# Patient Record
Sex: MALE | Race: WHITE | NOT HISPANIC OR LATINO
[De-identification: names, ages, dates, MRNs, and addresses within clinical notes are randomized per-mention and may not be internally consistent; named-entity substitution may affect disease eponyms.]

---

## 2018-01-22 ENCOUNTER — HISTORIC RESULTS (OUTPATIENT)
Dept: ADMINISTRATIVE | Age: 78
End: 2018-01-22

## 2020-09-28 ENCOUNTER — AMBULATORY - HEALTHEAST (OUTPATIENT)
Dept: CARDIAC REHAB | Facility: CLINIC | Age: 80
End: 2020-09-28

## 2020-09-28 DIAGNOSIS — I21.4 NON-ST ELEVATION MI (NSTEMI) (H): ICD-10-CM

## 2020-09-28 DIAGNOSIS — Z95.5 STENTED CORONARY ARTERY: ICD-10-CM

## 2020-10-01 ENCOUNTER — AMBULATORY - HEALTHEAST (OUTPATIENT)
Dept: CARDIAC REHAB | Facility: CLINIC | Age: 80
End: 2020-10-01

## 2020-10-01 DIAGNOSIS — Z95.5 STENTED CORONARY ARTERY: ICD-10-CM

## 2020-10-01 DIAGNOSIS — I21.4 NON-ST ELEVATION MI (NSTEMI) (H): ICD-10-CM

## 2020-10-14 ENCOUNTER — AMBULATORY - HEALTHEAST (OUTPATIENT)
Dept: CARDIAC REHAB | Facility: CLINIC | Age: 80
End: 2020-10-14

## 2020-10-14 DIAGNOSIS — Z95.5 STENTED CORONARY ARTERY: ICD-10-CM

## 2020-10-14 DIAGNOSIS — I21.4 NON-ST ELEVATION MI (NSTEMI) (H): ICD-10-CM

## 2020-10-21 ENCOUNTER — AMBULATORY - HEALTHEAST (OUTPATIENT)
Dept: CARDIAC REHAB | Facility: CLINIC | Age: 80
End: 2020-10-21

## 2020-10-21 DIAGNOSIS — Z95.5 STENTED CORONARY ARTERY: ICD-10-CM

## 2020-10-21 DIAGNOSIS — I21.4 NON-ST ELEVATION MI (NSTEMI) (H): ICD-10-CM

## 2020-10-21 RX ORDER — CLOPIDOGREL BISULFATE 75 MG/1
75 TABLET ORAL DAILY
Status: SHIPPED | COMMUNITY
Start: 2020-10-21

## 2020-10-21 RX ORDER — METOPROLOL SUCCINATE 25 MG/1
25 TABLET, EXTENDED RELEASE ORAL DAILY
Status: SHIPPED | COMMUNITY
Start: 2020-10-21

## 2020-10-21 RX ORDER — NITROGLYCERIN 0.4 MG/1
0.4 TABLET SUBLINGUAL EVERY 5 MIN PRN
Status: SHIPPED | COMMUNITY
Start: 2020-10-21

## 2020-10-21 RX ORDER — CALCIUM CARBONATE 500 MG/1
1 TABLET, CHEWABLE ORAL DAILY
Status: SHIPPED | COMMUNITY
Start: 2020-10-21

## 2020-10-21 RX ORDER — LISINOPRIL 5 MG/1
5 TABLET ORAL DAILY
Status: SHIPPED | COMMUNITY
Start: 2020-10-21

## 2020-10-21 RX ORDER — ASPIRIN 81 MG/1
81 TABLET, CHEWABLE ORAL DAILY
Status: SHIPPED | COMMUNITY
Start: 2020-10-21

## 2020-10-21 RX ORDER — DIPHENHYDRAMINE HCL 25 MG
25 CAPSULE ORAL EVERY 6 HOURS PRN
Status: SHIPPED | COMMUNITY
Start: 2020-10-21

## 2020-10-21 RX ORDER — POLYETHYLENE GLYCOL 3350 17 G/17G
17 POWDER, FOR SOLUTION ORAL DAILY
Status: SHIPPED | COMMUNITY
Start: 2020-10-21

## 2020-10-28 ENCOUNTER — AMBULATORY - HEALTHEAST (OUTPATIENT)
Dept: CARDIAC REHAB | Facility: CLINIC | Age: 80
End: 2020-10-28

## 2020-10-28 DIAGNOSIS — Z95.5 STENTED CORONARY ARTERY: ICD-10-CM

## 2020-10-28 DIAGNOSIS — I21.4 NON-ST ELEVATION MI (NSTEMI) (H): ICD-10-CM

## 2020-11-04 ENCOUNTER — AMBULATORY - HEALTHEAST (OUTPATIENT)
Dept: CARDIAC REHAB | Facility: CLINIC | Age: 80
End: 2020-11-04

## 2020-11-04 DIAGNOSIS — Z95.5 STENTED CORONARY ARTERY: ICD-10-CM

## 2020-11-04 DIAGNOSIS — I21.4 NON-ST ELEVATION MI (NSTEMI) (H): ICD-10-CM

## 2020-11-11 ENCOUNTER — AMBULATORY - HEALTHEAST (OUTPATIENT)
Dept: CARDIAC REHAB | Facility: CLINIC | Age: 80
End: 2020-11-11

## 2020-11-11 DIAGNOSIS — I21.4 NON-ST ELEVATION MI (NSTEMI) (H): ICD-10-CM

## 2020-11-11 DIAGNOSIS — Z95.5 STENTED CORONARY ARTERY: ICD-10-CM

## 2020-11-13 ENCOUNTER — AMBULATORY - HEALTHEAST (OUTPATIENT)
Dept: CARDIAC REHAB | Facility: CLINIC | Age: 80
End: 2020-11-13

## 2020-11-13 DIAGNOSIS — I21.4 NON-ST ELEVATION MI (NSTEMI) (H): ICD-10-CM

## 2020-11-13 DIAGNOSIS — Z95.5 STENTED CORONARY ARTERY: ICD-10-CM

## 2020-11-18 ENCOUNTER — AMBULATORY - HEALTHEAST (OUTPATIENT)
Dept: CARDIAC REHAB | Facility: CLINIC | Age: 80
End: 2020-11-18

## 2020-11-18 DIAGNOSIS — Z95.5 STENTED CORONARY ARTERY: ICD-10-CM

## 2020-11-18 DIAGNOSIS — I21.4 NON-ST ELEVATION MI (NSTEMI) (H): ICD-10-CM

## 2020-11-20 ENCOUNTER — AMBULATORY - HEALTHEAST (OUTPATIENT)
Dept: CARDIAC REHAB | Facility: CLINIC | Age: 80
End: 2020-11-20

## 2020-11-20 DIAGNOSIS — I21.4 NON-ST ELEVATION MI (NSTEMI) (H): ICD-10-CM

## 2020-11-20 DIAGNOSIS — Z95.5 STENTED CORONARY ARTERY: ICD-10-CM

## 2020-11-25 ENCOUNTER — AMBULATORY - HEALTHEAST (OUTPATIENT)
Dept: CARDIAC REHAB | Facility: CLINIC | Age: 80
End: 2020-11-25

## 2020-11-25 DIAGNOSIS — Z95.5 STENTED CORONARY ARTERY: ICD-10-CM

## 2020-11-25 DIAGNOSIS — I21.4 NON-ST ELEVATION MI (NSTEMI) (H): ICD-10-CM

## 2020-11-27 ENCOUNTER — AMBULATORY - HEALTHEAST (OUTPATIENT)
Dept: CARDIAC REHAB | Facility: CLINIC | Age: 80
End: 2020-11-27

## 2020-11-27 DIAGNOSIS — I21.4 NON-ST ELEVATION MI (NSTEMI) (H): ICD-10-CM

## 2020-11-27 DIAGNOSIS — Z95.5 STENTED CORONARY ARTERY: ICD-10-CM

## 2020-12-02 ENCOUNTER — AMBULATORY - HEALTHEAST (OUTPATIENT)
Dept: CARDIAC REHAB | Facility: CLINIC | Age: 80
End: 2020-12-02

## 2020-12-02 DIAGNOSIS — Z95.5 STENTED CORONARY ARTERY: ICD-10-CM

## 2020-12-02 DIAGNOSIS — I21.4 NON-ST ELEVATION MI (NSTEMI) (H): ICD-10-CM

## 2020-12-04 ENCOUNTER — AMBULATORY - HEALTHEAST (OUTPATIENT)
Dept: CARDIAC REHAB | Facility: CLINIC | Age: 80
End: 2020-12-04

## 2020-12-04 DIAGNOSIS — I21.4 NON-ST ELEVATION MI (NSTEMI) (H): ICD-10-CM

## 2020-12-04 DIAGNOSIS — Z95.5 STENTED CORONARY ARTERY: ICD-10-CM

## 2020-12-09 ENCOUNTER — AMBULATORY - HEALTHEAST (OUTPATIENT)
Dept: CARDIAC REHAB | Facility: CLINIC | Age: 80
End: 2020-12-09

## 2020-12-09 DIAGNOSIS — I21.4 NON-ST ELEVATION MI (NSTEMI) (H): ICD-10-CM

## 2020-12-09 DIAGNOSIS — Z95.5 STENTED CORONARY ARTERY: ICD-10-CM

## 2020-12-11 ENCOUNTER — AMBULATORY - HEALTHEAST (OUTPATIENT)
Dept: CARDIAC REHAB | Facility: CLINIC | Age: 80
End: 2020-12-11

## 2020-12-11 DIAGNOSIS — I21.4 NON-ST ELEVATION MI (NSTEMI) (H): ICD-10-CM

## 2020-12-11 DIAGNOSIS — Z95.5 STENTED CORONARY ARTERY: ICD-10-CM

## 2020-12-16 ENCOUNTER — AMBULATORY - HEALTHEAST (OUTPATIENT)
Dept: CARDIAC REHAB | Facility: CLINIC | Age: 80
End: 2020-12-16

## 2020-12-16 DIAGNOSIS — I21.4 NON-ST ELEVATION MI (NSTEMI) (H): ICD-10-CM

## 2020-12-16 DIAGNOSIS — Z95.5 STENTED CORONARY ARTERY: ICD-10-CM

## 2020-12-18 ENCOUNTER — AMBULATORY - HEALTHEAST (OUTPATIENT)
Dept: CARDIAC REHAB | Facility: CLINIC | Age: 80
End: 2020-12-18

## 2020-12-18 DIAGNOSIS — I21.4 NON-ST ELEVATION MI (NSTEMI) (H): ICD-10-CM

## 2020-12-18 DIAGNOSIS — Z95.5 STENTED CORONARY ARTERY: ICD-10-CM

## 2020-12-30 ENCOUNTER — AMBULATORY - HEALTHEAST (OUTPATIENT)
Dept: CARDIAC REHAB | Facility: CLINIC | Age: 80
End: 2020-12-30

## 2020-12-30 DIAGNOSIS — I21.4 NON-ST ELEVATION MI (NSTEMI) (H): ICD-10-CM

## 2021-01-06 ENCOUNTER — AMBULATORY - HEALTHEAST (OUTPATIENT)
Dept: CARDIAC REHAB | Facility: CLINIC | Age: 81
End: 2021-01-06

## 2021-01-06 DIAGNOSIS — I21.4 NON-ST ELEVATION MI (NSTEMI) (H): ICD-10-CM

## 2021-01-06 DIAGNOSIS — Z95.5 STENTED CORONARY ARTERY: ICD-10-CM

## 2021-01-08 ENCOUNTER — AMBULATORY - HEALTHEAST (OUTPATIENT)
Dept: CARDIAC REHAB | Facility: CLINIC | Age: 81
End: 2021-01-08

## 2021-01-08 DIAGNOSIS — Z95.5 STENTED CORONARY ARTERY: ICD-10-CM

## 2021-01-08 DIAGNOSIS — I21.4 NON-ST ELEVATION MI (NSTEMI) (H): ICD-10-CM

## 2021-02-03 ENCOUNTER — AMBULATORY - HEALTHEAST (OUTPATIENT)
Dept: CARDIAC REHAB | Facility: CLINIC | Age: 81
End: 2021-02-03

## 2021-02-03 DIAGNOSIS — I21.4 NON-ST ELEVATION MI (NSTEMI) (H): ICD-10-CM

## 2021-02-03 DIAGNOSIS — Z95.5 STENTED CORONARY ARTERY: ICD-10-CM

## 2021-02-05 ENCOUNTER — AMBULATORY - HEALTHEAST (OUTPATIENT)
Dept: CARDIAC REHAB | Facility: CLINIC | Age: 81
End: 2021-02-05

## 2021-02-05 DIAGNOSIS — Z95.5 STENTED CORONARY ARTERY: ICD-10-CM

## 2021-02-05 DIAGNOSIS — I21.4 NON-ST ELEVATION MI (NSTEMI) (H): ICD-10-CM

## 2021-02-26 ENCOUNTER — AMBULATORY - HEALTHEAST (OUTPATIENT)
Dept: CARDIAC REHAB | Facility: CLINIC | Age: 81
End: 2021-02-26

## 2021-02-26 DIAGNOSIS — Z95.5 STENTED CORONARY ARTERY: ICD-10-CM

## 2021-02-26 DIAGNOSIS — I21.4 NON-ST ELEVATION MI (NSTEMI) (H): ICD-10-CM

## 2021-06-05 VITALS — WEIGHT: 235 LBS

## 2021-06-05 VITALS — WEIGHT: 236.6 LBS

## 2021-06-05 VITALS — WEIGHT: 236.3 LBS

## 2021-06-05 VITALS — WEIGHT: 239.8 LBS

## 2021-06-05 VITALS — WEIGHT: 231.5 LBS

## 2021-06-05 VITALS — WEIGHT: 233.5 LBS

## 2021-06-05 VITALS — WEIGHT: 235.2 LBS

## 2021-06-05 VITALS — WEIGHT: 232.4 LBS

## 2021-06-05 VITALS — WEIGHT: 232.5 LBS

## 2021-06-05 VITALS — WEIGHT: 231.6 LBS

## 2021-06-05 VITALS — WEIGHT: 232.6 LBS

## 2021-06-05 VITALS — WEIGHT: 231.8 LBS

## 2021-06-05 VITALS — WEIGHT: 237.9 LBS

## 2021-06-05 VITALS — WEIGHT: 233.8 LBS

## 2021-06-05 VITALS — WEIGHT: 240 LBS

## 2021-06-11 NOTE — PROGRESS NOTES
"Summary of Event:  Pt arrived to Cardiac Rehab for his 1st Cardiac Rehab Phase II session. He states that he has had thisConstant vague \"funny feeling\"/\"gravel feeling\" in chest that does not get worse with activities. Pt states it might even feel \"weak or heavy\" in chest. Pt also reports that this discomfort has been there since he had has MI. On 9/30/2020 f/u with Cardiologist (Dr. Laughlin) is aware and pt is having a Staged PCI on 10/14/2020     Event:  Pt was assessed and evaluated and did a 6' walk test and at about 4.5 minutes into the walk and pt indicated that he started to have a \"Sharp\" chest pain 2/10 different than before starting exercise today. Pt completed 6' walk test and after walking back to education room the pain started to subside and after sitting within 5 minutes post ex pain was gone. However his baseline chest discomfort 1/10 remained the same for several minutes.     11:56 After pt was unhooked and escorted to the front of the dept-pt walked 50 feet and started to complain of different discomfort \"heart burning sensation that was getting sharp again 2/10\" Pt then escorted back to Cardiac Rehab vitals taken and re-hooked up to EKG monitor.    HR: 70bpm  BP: 124/64  O2 Sats: 95%  EKG: SR/1'AVB/BBB    Internal Code Called @ 1205 and evaluated by our team.    HR:70  BP:118/64  O2 Sats:96%  EKG: SR/1'AVB/BBB    @1213 Pt was escorted via w/c to ER Rm #[id number].     Information called/faxed to PA   Clinician Name: Dr. Laughlin-Cardiologist/PA-Amita Mireles at [facility]     Dispensation of Patient:  Sent to Emergency Room       Follow-up of Outcome: Review ER records to see if pt needs to have order to return to CR.     "

## 2021-06-11 NOTE — PROGRESS NOTES
"ITP ASSESSMENT   Assessment Day: Initial    Session Number: 1/2  Precautions: 10/14/20 Staged Intervention for RCA 75-95% stenosis, Age, Deconditioned, Overweight and LBP discomfort off and on    Diagnosis: MI;Stent;Other (Remaining CAD)    Risk Stratification: High    Referring Provider: Dr. Quinones-Cardiologist at [facility]  Exercise Assessment: Initial       6 Minute Walk Test   Pre   Pre Exercise HR: 71                    Pre Exercise BP: 120/60      Peak  Peak HR: 95                   Peak BP: 164/64    Peak feet: 1200    Peak O2 SAT: 95    Peak RPE: 6    Peak MPH: 2.27      Symptoms:  Peak Symptoms: (S) ~4.5 minutes within the walk pt reports having a Sharp pain in his chest 2/10. It took ~5 minutes to go away and pt then was back to baseline constant \"funny, weak, ?heavy feeling in chest\".       5 mins. Post  5 Min Post HR: 75    5 Min Post BP: 112/60                           Exercise Plan  Goals Next 30 days  ADL'S: Goal #1: Resume 14 stairs without angina s/s. Monitor pt's EKG/Vitals/Sx's on stairs in cardiac rehab. Safely progress levels up to a 3.5 to 4 MET level.     Leisure: Goal #2: Resume fall yardwork duties without cv s/s. Increase METs up to 4-4.5METs to mock raking, mowing MET levels.     Work: LTG is to reach a 5-6MET level to return to all activities such as: carrying laundry upstairs (5METs), Yardwork: raking (4METs), Weed Whip (4 METs), Self Propel Mow (4.5METs), using a \"scoop shovel on inclined drive way and carry up to 10lbs up incline (5-6 METs), Carry 40lbs of mulch (5 METs), climb step ladder (6+METs).`      Education Goals: Patient can state cardiac s/s and appropriate emergency response.    Education Goals Met: Has system for taking medication.;Medication review. (Reviewed NTG and encouraged pt to carry on him at all times)      Exercise Prescription  Exercise Mode: Treadmill;Bike;Nustep;Arm Erg.;Stairs;Hallway Walking    Frequency: 1-2x/week    Duration: 30-60 " minutes    Intensity / THR: 20-30 beats above resting heart rate    RPE 11-14  Progression / Met level: 2.7-4+ with LTG of 5-6METs    Resistive Training?: No      Current Exercise (mins/week): 0      Interventions  Home Exercise:  Mode: Walking    Frequency: 2-3x/day on non rehab days     Duration: 5-10 minutes-pending symptoms      Education Material : Provide written material;Individual education and counseling;Offer educational classes;Educational videos      Education Completed  Exercise Education Completed: Cardiac Anatomy;Signs and Symptoms;Medication review;RPE;Emergency Plan;Home Exercise;FITT Principles;Warm up/cool down;BP/HR Reponse to exercise;Stretching;Strength training;Benefits of Exercise;End point of exercise              Exercise Follow-up/Discharge  Follow up/Discharge: Skilled therapy needed to monitor pt's cardiac symptoms, especially with known CAD/Stenosis with Stage Intervention that is scheduled 10/14/[facility] at [facility]. Continue to monitor EKG s/p NSTEMI , monitor proper cv response with HR/BP and O2 sats throughout ex with sx's. Provide education on CRF management. Encourage pt to meet with dietician. Educate pt with exercise vs activity and the benefits of exercise on the heart health. Continue to safely progress pt from 2.7METs to LTG of 5-6METs   NUTRITION  Nutrition Assessment: Initial      Nutrition Risk Factors:  Nutrition Risk Factors: Dyslipidemia;Overweight  Cholesterol: 9/1/20: 123  LDL: 54  HDL: 43  Triglycerides: 130      Nutrition Plan  Interventions  Diet Consult: NA (Initial session)    Other Nutrition Intervention: Therapist/Pt Discussion;Provide with Written Material    Initial Rate Your Plate Score: 0 (Pt will complete and return next session)      Education Completed  Nutrition Education Completed: Low Saturated fat diet;Risk factor overview;Low sodium diet      Goals  Nutrition Goals (Next 30 days): Patient will lose weight;Patient knows appropriate portion  "size;Patient will follow a low saturated fat diet;Review Dietitian schedule;Patient will follow a low sodium diet;Patient can identify their risk factors for CAD      Goals Met  Nutrition Goals Met: Reviewed Dietitian schedule;Provided Rate your Plate Survey;Completed Nutritional Risk Screen      Height, Weight, and  BMI  Weight: 239 lb 12.8 oz (108.8 kg)  Height: 5' 7\" (1.702 m)  BMI: 37.55      Nutrition Follow-up  Follow-up/Discharge: Pt would like to lose weight. STG is <230lbs and LTG is 220lbs. Pt states he has hard time losing weight and is looking fwd to weight loss. He states he has done weight watchers for several years. Pt would benefit to meeting with dietician for aide to help with weight loss goals. In CR encourage pt to attend CR regularly and start walking for home ex program.         Other Risk Factors  Other Risk Factor Assessment: Initial      HTN Risk Factor: Hypertension      Pre Exercise BP: 120/60  Post Exercise BP: (S) 112/60 (96%)      Hypertension Plan  Goals  HTN Goals: Follow low sodium diet;Exercises regularly      Goals Met  HTN Goals Met: Take medication as prescribed      HTN Interventions  HTN Interventions: Therapist/patient discussion;Provide written material;Diet consult      HTN Education Completed  HTN Education Completed: Risk factor overview;Medication review;Low sodium diet      Tobacco Risk Factor: NA (Quit 8/2003)      Risk Factor Follow-up   Follow-up/Discharge: CRF: Family Hx. HTN, Dyslipidemia, Inactivity     PSYCHOSOCIAL  Psychosocial Assessment: Initial       [facility] Q of L Summary Score: 15      PHQ-9 Total Score: 2      Psychosocial Risk Factor: NA (Pt states he manages stressors well. E.g finances)      Psychosocial Plan  Interventions  If PHQ-9 is >9, send letter to MD  Interventions: Provide written material;Individual education and counseling       Education Completed  Education Completed: Relaxation/Coping Techniques;S/S of depression;Effects of stress on " body      Goals  Goals (Next 30 days): Patient demonstrates understanding of stress, no goals identified for the next 30 days      Goals Met  Goals Met: Practicing stress management skills;Identify stressors;Identified Support system      Psychosocial Follow-up  Follow-up/Discharge: Pt overall feels he is managing his stressors well e.g. finances however feels he can just let things go. He enjoys watching sports on TV for relaxation.             Patient involved in Goal setting?: Yes
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
## 2021-06-12 NOTE — PROGRESS NOTES
Bertin Bowman has participated in 5 sessions of Phase II Cardiac Rehab.    Progress Report:   Cardiac Rehab Treatment Progress Report 10/1/2020 10/1/2020 10/14/2020 10/21/2020 10/28/2020   Weight 239 lbs 13 oz 251 lbs 236 lbs 10 oz 240 lbs 235 lbs   Pre Exercise  HR 71 - 76 75 78   Pre Exercise /60 - 92/48 108/64 122/68   Treadmill Peak HR - - 91 102 101   Treadmill Peak Blood Pressure - - - 144/62 142/64   Nustep Peak Heart Rate - -  80-85 -   Nustep Peak Blood Pressure - - 116/64 - -   Heart Rate 75 - 75 83 75   Post Exercise /60 - 116/52 112/64 102/58   ECG SR/1'AVB/BBB-no EKG changes with sharp chest pain/heart burn - SR/SA/1'AVB/BBB, variable rates between ,  3beat ST run with compensatory pause and PVC when getting up from nustep, denied symptoms SR/1' AVB/IVCD SR/ 1' AVB/IVCD   Total Exercise Minutes 6 - 30 26 29   Current Status:  Currently exercising without complaints or symptoms. No sx's since initial session.     If Physician recommends change in treatment plan, please place orders.        __________________________________________________      _____________  Signature                                                                                                  Date

## 2021-06-12 NOTE — PROGRESS NOTES
"ITP ASSESSMENT   Assessment Day: 30 Day    Session Number: 4  Precautions: Standard Cardiac precautions-recent PCI with stent to RCA, Age, Deconditioned, Overweight and LBP discomfort off and on    Diagnosis: MI;Stent (PCI x 5 stent total )    Risk Stratification: High    Referring Provider: TORO Laughlin  EXERCISE  Exercise Assessment: Reassessment                         Exercise Plan  Goals Next 30 days   Goal #1: Continue to resume 14 stairs without angina s/s. Monitor pt's EKG/Vitals/Sx's on stairs in cardiac rehab. Safely progress levels up to a 3.5 to 4 MET level.      Goal #2: Continue to resume fall yardwork duties without cv s/s. Increase METs up to 4-4.5METs to mock raking, mowing MET levels.     LTG is to reach a 5-6MET level to return to all activities such as: carrying laundry upstairs (5METs), Yardwork: raking (4METs), Weed Whip (4 METs), Self Propel Mow (4.5METs), using a \"scoop shovel on inclined drive way and carry up to 10lbs up incline (5-6 METs), Carry 40lbs of mulch (5 METs), climb step ladder (6+METs).`      Education Goals: Patient can state cardiac s/s and appropriate emergency response.    Education Goals Met: Has system for taking medication.;Medication review. (Reviewed NTG and encouraged pt to carry on him at all times)                          Goals Met  Initial ADL's goals met: Goal #1 NOT met : Continue goal of resuming 14 stairs without angina s/s. Monitor pt's EKG/Vitals/Sx's on stairs in cardiac rehab. Safely progress levels up to a 3.5 to 4 MET level.     Initial Leisure goals met: Goal #2 NOT met : Continue goal of resuming fall yardwork duties without cv s/s. Increase METs up to 4-4.5METs to mock raking, mowing MET levels.     Intial Work goals met: LTG NOT met : Continue goal of reaching a 5-6MET level to return to all activities such as: carrying laundry upstairs (5METs), Yardwork: raking (4METs), Weed Whip (4 METs), Self Propel Mow (4.5METs), using a \"scoop shovel on inclined " "drive way and carry up to 10lbs up incline (5-6 METs), Carry 40lbs of mulch (5 METs), climb step ladder (6+METs).`    Initial Progression: Pt has reached a 2.4 MET level on Nustep for 20-25' and a 2.8 METS on the TM for 15'. Pt has completed only 2 sessions since PCI being placed on 10/08. Pt limitations include CR being held due to staged PCI due to angina sx. Pt continues to have vague sx's of \"funny feeling in chest\" with sitting and lying down but not with exertion at this time he is taking his time with some activites-continue to monitor closely cardiac sx's.  Pt only attends 1x/ wk pending our schedule to increase pt to  2x/ wk at this time to beable to progress pt to goals. Also utlize a variety of modalities to progress.       Exercise Prescription  Exercise Mode: Treadmill;Bike;Nustep;Arm Erg.;Stairs;Hallway Walking (Utilize continuous and interval ex.)    Frequency: 1-2x/week    Duration: 30-60 minutes    Intensity / THR: 20-30 beats above resting heart rate    RPE 11-14  Progression / Met level: 2.7-4+ with LTG of 5-6METs    Resistive Training?: Yes      Current Exercise (mins/week): 110      Interventions  Home Exercise:  Mode: Walking    Frequency: 3-4x/ wk     Duration: 30 minutes      Education Material : Provide written material;Individual education and counseling      Education Completed  Exercise Education Completed: Cardiac Anatomy;Signs and Symptoms;Medication review;RPE;Emergency Plan;Home Exercise;FITT Principles;Warm up/cool down;BP/HR Reponse to exercise;Stretching;Strength training;Benefits of Exercise;End point of exercise              Exercise Follow-up/Discharge  Follow up/Discharge: Continue to monitor EKG s/p NSTEMI and recent SA with tachy runs , monitor proper cv response with HR/BP and O2 sats throughout ex with sx's. Provide education on CRF management. Encourage pt to meet with dietician. Educate pt with exercise vs activity and the benefits of exercise on the heart health. " "Continue to safely progress pt from 2.7METs to LTG of 5-6METs utilize continuous and interval ex.           NUTRITION  Nutrition Assessment: Reassessment    Nutrition Risk Factors:  Nutrition Risk Factors: Dyslipidemia;Overweight  Cholesterol: 9/1/20: 123  LDL: 54  HDL: 43  Triglycerides: 130      Nutrition Plan  Interventions  Diet Consult: NA (apt 10/29 at 1000 am )    Other Nutrition Intervention: Therapist/Pt Discussion;Provide with Written Material    Initial Rate Your Plate Score: 48      Education Completed  Nutrition Education Completed: Low Saturated fat diet;Risk factor overview;Low sodium diet      Goals  Nutrition Goals (Next 30 days): Patient will lose weight;Patient knows appropriate portion size;Patient will follow a low saturated fat diet;Patient will follow a low sodium diet      Goals Met  Nutrition Goals Met: Reviewed Dietitian schedule;Provided Rate your Plate Survey;Completed Nutritional Risk Screen;Patient can identify their risk factors for CAD      Height, Weight, and  BMI  Weight: 240 lb (108.9 kg)  Height: 5' 7\" (1.702 m)  BMI: 37.58      Nutrition Follow-up  Follow-up/Discharge: Pt is scheduled for dietician on 10/29 at 1000 to talk about weight loss goals. In CR we will utilize strength training and interval exercise to help with weight loss goals. Pt is eager for weight loss program to start with STG is <230lbs and LTG is 220lbs.       Other Risk Factors  Other Risk Factor Assessment: Reassessment      HTN Risk Factor: Hypertension      Pre Exercise BP: 108/64  Post Exercise BP: 112/64      Hypertension Plan  Goals  HTN Goals: Follow low sodium diet      Goals Met  HTN Goals Met: Take medication as prescribed;Exercises regularly      HTN Interventions  HTN Interventions: Therapist/patient discussion;Provide written material;Diet consult      HTN Education Completed  HTN Education Completed: Risk factor overview;Medication review;Low sodium diet      Tobacco Risk Factor: NA    Risk Factor " Follow-up   Follow-up/Discharge: CRF: Family Hx. HTN, Dyslipidemia, Inactivity= hand out given on cardiac risk factors          PSYCHOSOCIAL  Psychosocial Assessment: Reassessment       [facility] Q of L Summary Score: 15 (Pre)      PHQ-9 Total Score: 2 (Pre)      Psychosocial Risk Factor: NA      Psychosocial Plan  Interventions  Interventions: Provide written material;Individual education and counseling      Education Completed  Education Completed: Relaxation/Coping Techniques;S/S of depression;Effects of stress on body      Goals  Goals (Next 30 days): Patient demonstrates understanding of stress, no goals identified for the next 30 days      Goals Met  Goals Met: Practicing stress management skills;Identify stressors;Identified Support system         Patient involved in Goal setting?: Yes

## 2021-06-13 NOTE — PROGRESS NOTES
"ITP ASSESSMENT   Assessment Day: 60 Day    Session Number: 8  Precautions: Standard Cardiac precautions-recent PCI with stent to RCA, Age, Deconditioned, Overweight and LBP discomfort off and on    Diagnosis: MI;Stent (PCI x 5 stent total )    Risk Stratification: High    Referring Provider: Richard Briceno MD   ITP:Dr. Carroll  EXERCISE  Exercise Assessment: Reassessment                              Exercise Plan  Goals Next 30 days  ADL'S: Goal#1: Pt will consistently exercise at home 3-4x/week, walking or stationary bike for 20-30 min    Leisure: Goal #2: Continue to resume fall yardwork duties without cv s/s. Increase METs up to 4-4.5METs to mock raking, mowing MET levels.     Work: LTG is to reach a 5-6MET level to return to all activities such as: carrying laundry upstairs (5METs), Yardwork: raking (4METs), Weed Whip (4 METs), Self Propel Mow (4.5METs), using a \"scoop shovel on inclined drive way and carry up to 10lbs up incline (5-6 METs), Carry 40lbs of mulch (5 METs), climb step ladder (6+METs).`      Education Goals: All goals in this section met    Education Goals Met: Patient can state cardiac s/s and appropriate emergency response.;Has system for taking medication.;Medication review.                          Goals Met  30 day ADL'S goals met: Goal #1 met, pt states he is climbing 14 stairs at home without symptoms or SOB.  Pt did do stair climbing in CR 1x without difficulty.     30 day Leisure goals met: Goal#2 partially met, pt reports he was able to complete his yardwork, including raking and mowing without symptoms.  Pt has not consistently met MEt level goal of 4-4.5 in CR    30 day Work goals met: LTG partially met, pt was able to finish his yardwork, continues gradually increasing MEt level in cardiac rehab.    30 Day Progression: Pt has been attending CR 1x/week and not been exercising regularly on non-rehab days.  Encouraged pt to walk or ride his stationary bike at east 2-3x/week at home.  " "Pt will add Friday's at 8am for CR starting 11/13.  Pt has reached 3 MET level on treadmill and tolerated 4.7 MET level on stairs for 2 min.      Initial ADL's goals met: Goal #1 NOT met : Continue goal of resuming 14 stairs without angina s/s. Monitor pt's EKG/Vitals/Sx's on stairs in cardiac rehab. Safely progress levels up to a 3.5 to 4 MET level.     Initial Leisure goals met: Goal #2 NOT met : Continue goal of resuming fall yardwork duties without cv s/s. Increase METs up to 4-4.5METs to mock raking, mowing MET levels.     Intial Work goals met: LTG NOT met : Continue goal of reaching a 5-6MET level to return to all activities such as: carrying laundry upstairs (5METs), Yardwork: raking (4METs), Weed Whip (4 METs), Self Propel Mow (4.5METs), using a \"scoop shovel on inclined drive way and carry up to 10lbs up incline (5-6 METs), Carry 40lbs of mulch (5 METs), climb step ladder (6+METs).`    Initial Progression: Pt has reached a 2.4 MET level on Nustep for 20-25' and a 2.8 METS on the TM for 15'. Pt has completed only 2 sessions since PCI being placed on 10/08. Pt limitations include CR being held due to staged PCI due to angina sx. Pt continues to have vague sx's of \"funny feeling in chest\" with sitting and lying down but not with exertion at this time he is taking his time with some activites-continue to monitor closely cardiac sx's.  Pt only attends 1x/ wk pending our schedule to increase pt to  2x/ wk at this time to beable to progress pt to goals. Also utlize a variety of modalities to progress.       Exercise Prescription  Exercise Mode: Treadmill;Bike;Nustep;Arm Erg.;Stairs;Hallway Walking    Frequency: 2x/week    Duration: 30-40 min    Intensity / THR: 20-30 beats above resting heart rate    RPE 4-7  Progression / Met level: 3-5    Resistive Training?: Yes      Current Exercise (mins/week): 90      Interventions  Home Exercise:  Mode: walk, stationary bike    Frequency: 2-3x/week    Duration: 20-30 " "min      Education Material : Provide written material;Individual education and counseling      Education Completed  Exercise Education Completed: Cardiac Anatomy;Signs and Symptoms;Medication review;RPE;Emergency Plan;Home Exercise;FITT Principles;Warm up/cool down;BP/HR Reponse to exercise;Stretching;Strength training;Benefits of Exercise;End point of exercise              Exercise Follow-up/Discharge  Follow up/Discharge: Skilled therapy continues to be needed to monitor ECG due to recent SA with tachy runs as pt continues to increase intensity of exercise to mimic home activities. Pt was able to do his yardwork of raking and mowing. Consider interval exercise for pt to continue progressing to LTG of 5-6 METS.  Pt has started light wt training in CR.  Continue to offer education as needed.   NUTRITION  Nutrition Assessment: Reassessment      Nutrition Risk Factors:  Nutrition Risk Factors: Dyslipidemia;Overweight  Cholesterol: 123  LDL: 54  HDL: 43  Triglycerides: 130      Nutrition Plan  Interventions  Diet Consult: Completed    Other Nutrition Intervention: Diet Class;Therapist/Pt Discussion;Provide with Written Material    Initial Rate Your Plate Score: 48      Education Completed  Nutrition Education Completed: Low Saturated fat diet;Low sodium diet;Weight management      Goals  Nutrition Goals (Next 30 days): Patient will lose weight      Goals Met  Nutrition Goals Met: Patient has lost weight;Patient follows a low sodium diet;Patient states following a low saturated fat diet;Patient knows appropriate portion size;Patient can identify their risk factors for CAD      Height, Weight, and  BMI  Weight: 237 lb 11.2 oz (107.8 kg)  Height: 5' 7\" (1.702 m)  BMI: 37.22      Nutrition Follow-up  Follow-up/Discharge: Pt reports he has been reading labels regularly and is paying more attention to following heart healthy diet.  Pt reports he is eating low sodium/low fat about 75% of the time.         Other Risk " Factors  Other Risk Factor Assessment: Reassessment      HTN Risk Factor: Hypertension      Pre Exercise BP: 102/54  Post Exercise BP: 120/64      Hypertension Plan  Goals  HTN Goals: Exercises regularly      Goals Met  HTN Goals Met: Follow low sodium diet;Take medication as prescribed      HTN Interventions  HTN Interventions: Therapist/patient discussion;Provide written material;Diet consult      HTN Education Completed  HTN Education Completed: Risk factor overview;Medication review;Low sodium diet      Tobacco Risk Factor: NA        Risk Factor Follow-up   Follow-up/Discharge: Pt has not been exercising regularly at home.  Encouraged pt to walk or ride his stationary bike at least 2-3x/week on non-rehab days.     PSYCHOSOCIAL  Psychosocial Assessment: Reassessment       [facility]      PHQ-9 Total Score: 2      Psychosocial Risk Factor: NA      Psychosocial Plan  Interventions  Interventions: Provide written material;Individual education and counseling      Education Completed  Education Completed: Relaxation/Coping Techniques      Goals  Goals (Next 30 days): Patient demonstrates understanding of stress, no goals identified for the next 30 days      Goals Met  Goals Met: Practicing stress management skills;Identify stressors;Identified Support system      Psychosocial Follow-up  Follow-up/Discharge: Pt likes to watch tv/football for relaxation.             Patient involved in Goal setting?: Yes      Signature: _____________________________________________________________    Date: __________________    Time: __________________

## 2021-06-13 NOTE — PROGRESS NOTES
"ITP ASSESSMENT   Assessment Day: 90 Day    Session Number: 16  Precautions: Standard Cardiac precautions-recent PCI with stent to RCA, Age, Deconditioned, Overweight and LBP discomfort off and on    Diagnosis: MI;Stent (PCI x 5 stent total )    Risk Stratification: High    Referring Provider: Richard Briceno MD  EXERCISE  Exercise Assessment: Reassessment                       Exercise Plan  Goals Next 30 days  Goal#1: Pt will consistently exercise at home 3 x/week, walking or stationary bike for 30 min. for aerobic ex and strength train 2x/week. Continue to increase METs from 3.7 to 5+METs in CR using a variety of modalites and both continous and interval training to safely progress pt to goals    Goal #2: Lose weight. Current weight 231.8lbs and goal to lose 0.5-1lb per week.     LTG is to reach a 5-6MET level to return to all activities such as: carrying laundry upstairs (5METs), Yardwork: raking (4METs), Weed Whip (4 METs), Self Propel Mow (4.5METs), using a \"scoop shovel on inclined drive way and carry up to 10lbs up incline (5-6 METs), Carry 40lbs of mulch (5 METs), climb step ladder (6+METs).`    Education Goals: All goals in this section met    Education Goals Met: Patient can state cardiac s/s and appropriate emergency response.;Has system for taking medication.;Medication review.                        Goals Met  60 day ADL'S goals met: Goal #1 not met-pt has been exercising at home 2x/week and 2x/week in CR for 20-30 minutes. Pt is willing to increase to 3x/week and 30 minutes-goal revised    60 day Work goals met: LTG not met-continue with LTG adn safely progress pt to levels     60 Day Progression: Pt has been limited due to mid sternal chest discomfort-pt has been vague with cardiac s/s. Continue to educate on CV s/s and end points of exercise/emergency plan. Pt has reached a 3.7-3.8METs on TM for 20-25 minutes. Change up modalties to progress to LTG of 5-6METs      30 day ADL'S goals met: Goal #1 " "met, pt states he is climbing 14 stairs at home without symptoms or SOB.  Pt did do stair climbing in CR 1x without difficulty.     30 day Leisure goals met: Goal#2 partially met, pt reports he was able to complete his yardwork, including raking and mowing without symptoms.  Pt has not consistently met MEt level goal of 4-4.5 in CR    30 day Work goals met: LTG partially met, pt was able to finish his yardwork, continues gradually increasing MEt level in cardiac rehab.    30 Day Progression: Pt has been attending CR 1x/week and not been exercising regularly on non-rehab days.  Encouraged pt to walk or ride his stationary bike at east 2-3x/week at home.  Pt will add Friday's at 8am for CR starting 11/13.  Pt has reached 3 MET level on treadmill and tolerated 4.7 MET level on stairs for 2 min.      Initial ADL's goals met: Goal #1 NOT met : Continue goal of resuming 14 stairs without angina s/s. Monitor pt's EKG/Vitals/Sx's on stairs in cardiac rehab. Safely progress levels up to a 3.5 to 4 MET level.     Initial Leisure goals met: Goal #2 NOT met : Continue goal of resuming fall yardwork duties without cv s/s. Increase METs up to 4-4.5METs to mock raking, mowing MET levels.     Intial Work goals met: LTG NOT met : Continue goal of reaching a 5-6MET level to return to all activities such as: carrying laundry upstairs (5METs), Yardwork: raking (4METs), Weed Whip (4 METs), Self Propel Mow (4.5METs), using a \"scoop shovel on inclined drive way and carry up to 10lbs up incline (5-6 METs), Carry 40lbs of mulch (5 METs), climb step ladder (6+METs).`    Initial Progression: Pt has reached a 2.4 MET level on Nustep for 20-25' and a 2.8 METS on the TM for 15'. Pt has completed only 2 sessions since PCI being placed on 10/08. Pt limitations include CR being held due to staged PCI due to angina sx. Pt continues to have vague sx's of \"funny feeling in chest\" with sitting and lying down but not with exertion at this time he is " taking his time with some activites-continue to monitor closely cardiac sx's.  Pt only attends 1x/ wk pending our schedule to increase pt to  2x/ wk at this time to beable to progress pt to goals. Also utlize a variety of modalities to progress.       Exercise Prescription  Exercise Mode: Treadmill;Bike;Nustep;Arm Erg.;Stairs;Hallway Walking (Utilize continuous and interval ex.)    Frequency: 2x/week    Duration: 30-40min    Intensity / THR: 20-30 beats above resting heart rate    RPE 11-14  Progression / Met level: 3.8 to 5+ with LTG of 5-6METs    Resistive Training?: Yes      Current Exercise (mins/week): 118      Interventions  Home Exercise:  Mode: Walking, Biking-Stationary    Frequency: 3-4x/week    Duration: 30 minutes      Education Material : Provide written material;Individual education and counseling      Education Completed  Exercise Education Completed: Cardiac Anatomy;Signs and Symptoms;Medication review;RPE;Emergency Plan;Home Exercise;FITT Principles;Warm up/cool down;BP/HR Reponse to exercise;Stretching;Strength training;Benefits of Exercise;End point of exercise              Exercise Follow-up/Discharge  Follow up/Discharge: Skilled therapy needed to continue to monitor EKG for arrhythmias or ST changes s/p NSTEMI/Stents vs a CABG. Monitor for proper cv response. Continue to educate on CV s/s and end points of exericse. Educate on strength training routine and continous vs interval ex benefits. Start intervals to help with cv fitness and weight loss goals.    NUTRITION  Nutrition Assessment: Reassessment    Nutrition Risk Factors:  Nutrition Risk Factors: Dyslipidemia;Overweight  Cholesterol: 123  LDL: 54  HDL: 43  Triglycerides: 130    Nutrition Plan  Interventions  Diet Consult: Completed    Other Nutrition Intervention: Diet Class;Therapist/Pt Discussion;Provide with Written Material    Initial Rate Your Plate Score: 48    Follow-Up Rate Your Plate Score: 56    Education Completed  Nutrition  "Education Completed: Low Saturated fat diet;Low sodium diet;Weight management    Goals  Nutrition Goals (Next 30 days): Patient will lose weight      Goals Met  Nutrition Goals Met: Patient follows a low sodium diet;Patient states following a low saturated fat diet;Patient knows appropriate portion size;Patient has lost weight    Height, Weight, and  BMI  Weight: 231 lb 8 oz (105 kg)  Height: 5' 7\" (1.702 m)  BMI: 36.25      Nutrition Follow-up  Follow-up/Discharge: RD diet educ completed 12/4/2020. Pt is comfortable with diet at this time and has already met with dietician a few times about heart healthy diet and weight loss.         Other Risk Factors  Other Risk Factor Assessment: Reassessment      HTN Risk Factor: Hypertension      Pre Exercise BP: 116/58  Post Exercise BP: 112/60      Hypertension Plan  Goals  HTN Goals: Exercises regularly      Goals Met  HTN Goals Met: Follow low sodium diet;Take medication as prescribed      HTN Interventions  HTN Interventions: Therapist/patient discussion;Provide written material;Diet consult      HTN Education Completed  HTN Education Completed: Risk factor overview;Medication review;Low sodium diet      Tobacco Risk Factor: NA      Risk Factor Follow-up   Follow-up/Discharge: CRF: Family Hx. HTN, Dyslipidemia, Inactivity-pt is aware of all CRF.      PSYCHOSOCIAL  Psychosocial Assessment: Reassessment       [facility] Q of L Summary Score: 15      PHQ-9 Total Score: 2      Psychosocial Risk Factor: NA      Psychosocial Plan  Interventions  Interventions: Provide written material;Individual education and counseling      Education Completed  Education Completed: Relaxation/Coping Techniques      Goals  Goals (Next 30 days): Patient demonstrates understanding of stress, no goals identified for the next 30 days      Goals Met  Goals Met: Practicing stress management skills;Identify stressors;Identified Support system      Psychosocial Follow-up  Follow-up/Discharge: Pt " likes to watch tv/football for relaxation.             Patient involved in Goal setting?: Yes

## 2021-06-14 NOTE — PROGRESS NOTES
"ITP ASSESSMENT   Assessment Day: 120 Day    Session Number: 21  Precautions: Standard Cardiac precautions-recent PCI with stent to RCA, Age, Deconditioned, Overweight and LBP discomfort off and on    Diagnosis: MI;Stent (PCI x 5 stent total )    Risk Stratification: High    Referring Provider: Richard Briceno MD  EXERCISE  Exercise Assessment: Reassessment                       Exercise Plan  Goals Next 30 days   Goal#1: Pt will consistently exercise at home 3 x/week, walking or stationary bike for 30 min. for aerobic ex and strength train 2x/week. Continue to increase METs from 4 to 5+METs in CR using a variety of modalites and both continous and interval training to safely progress pt to goals. Encourage pt to try and change up modalties for more varitey and increase METs.    Goal #2: Lose weight. Current weight 231.8lbs and goal to lose 0.5-1lb per week. Pt will change up ex program and be more consistent with strength training.    LTG is to reach a 5-6MET level to return to all activities such as: carrying laundry upstairs (5METs), Yardwork: raking (4METs), Weed Whip (4 METs), Self Propel Mow (4.5METs), using a \"scoop shovel on inclined drive way and carry up to 10lbs up incline (5-6 METs), Carry 40lbs of mulch (5 METs), climb step ladder (6+METs).`      Education Goals: All goals in this section met    Education Goals Met: Patient can state cardiac s/s and appropriate emergency response.;Has system for taking medication.;Medication review.                          Goals Met  90 day ADL'S goals met: Pt has started intervals and progressed to 3.8-4METs with intervals on TM    90 day Leisure goals met: Pt has done weights a few times in CR but not consistent. Pt agree's to plan. Pt did not lose weight over the last 30 days. Pt increased from 213.8lbs to 232.6lb.    90 day Work goals met: LTG not met-continue to progress    90 Day Progress: Pt has been progressing well. Occasional hypertensive. Continue to " monitor BP closely      60 day ADL'S goals met: Goal #1 not met-pt has been exercising at home 2x/week and 2x/week in CR for 20-30 minutes. Pt is willing to increase to 3x/week and 30 minutes-goal revised    60 day Work goals met: LTG not met-continue with LTG adn safely progress pt to levels     60 Day Progression: Pt has been limited due to mid sternal chest discomfort-pt has been vague with cardiac s/s. Continue to educate on CV s/s and end points of exercise/emergency plan. Pt has reached a 3.7-3.8METs on TM for 20-25 minutes. Change up modalties to progress to LTG of 5-6METs      30 day ADL'S goals met: Goal #1 met, pt states he is climbing 14 stairs at home without symptoms or SOB.  Pt did do stair climbing in CR 1x without difficulty.     30 day Leisure goals met: Goal#2 partially met, pt reports he was able to complete his yardwork, including raking and mowing without symptoms.  Pt has not consistently met MEt level goal of 4-4.5 in CR    30 day Work goals met: LTG partially met, pt was able to finish his yardwork, continues gradually increasing MEt level in cardiac rehab.    30 Day Progression: Pt has been attending CR 1x/week and not been exercising regularly on non-rehab days.  Encouraged pt to walk or ride his stationary bike at east 2-3x/week at home.  Pt will add Friday's at 8am for CR starting 11/13.  Pt has reached 3 MET level on treadmill and tolerated 4.7 MET level on stairs for 2 min.      Initial ADL's goals met: Goal #1 NOT met : Continue goal of resuming 14 stairs without angina s/s. Monitor pt's EKG/Vitals/Sx's on stairs in cardiac rehab. Safely progress levels up to a 3.5 to 4 MET level.     Initial Leisure goals met: Goal #2 NOT met : Continue goal of resuming fall yardwork duties without cv s/s. Increase METs up to 4-4.5METs to mock raking, mowing MET levels.     Intial Work goals met: LTG NOT met : Continue goal of reaching a 5-6MET level to return to all activities such as: carrying  "laundry upstairs (5METs), Yardwork: raking (4METs), Weed Whip (4 METs), Self Propel Mow (4.5METs), using a \"scoop shovel on inclined drive way and carry up to 10lbs up incline (5-6 METs), Carry 40lbs of mulch (5 METs), climb step ladder (6+METs).`    Initial Progression: Pt has reached a 2.4 MET level on Nustep for 20-25' and a 2.8 METS on the TM for 15'. Pt has completed only 2 sessions since PCI being placed on 10/08. Pt limitations include CR being held due to staged PCI due to angina sx. Pt continues to have vague sx's of \"funny feeling in chest\" with sitting and lying down but not with exertion at this time he is taking his time with some activites-continue to monitor closely cardiac sx's.  Pt only attends 1x/ wk pending our schedule to increase pt to  2x/ wk at this time to beable to progress pt to goals. Also utlize a variety of modalities to progress.       Exercise Prescription  Exercise Mode: Treadmill;Bike;Nustep;Arm Erg.;Stairs;Hallway Walking    Frequency: 2x/week    Duration: 30-60 minutes    Intensity / THR: 20-30 beats above resting heart rate    RPE 11-14  Progression / Met level: 4-5+    Resistive Training?: Yes      Current Exercise (mins/week): 140      Interventions  Home Exercise:  Mode: Walking, Biking-Stationary    Frequency: 3-4x/week    Duration: 30'      Education Material : Provide written material;Individual education and counseling      Education Completed  Exercise Education Completed: Cardiac Anatomy;Signs and Symptoms;Medication review;RPE;Emergency Plan;Home Exercise;FITT Principles;Warm up/cool down;BP/HR Reponse to exercise;Stretching;Strength training;Benefits of Exercise;End point of exercise              Exercise Follow-up/Discharge  Follow up/Discharge: 1/8/21: Skilled therapy needed to continue to monitor EKG for arrhythmias or ST changes s/p NSTEMI/Stents vs a CABG. Educate on benefits of Strength training and encourage pt to do more consistent for weight loss management. " "Continue to progress safely to LTG and encourage pt to try other modalties. Watch BP closely due to occasional hypertension   NUTRITION  Nutrition Assessment: Reassessment    Nutrition Risk Factors:  Nutrition Risk Factors: Dyslipidemia;Overweight  Cholesterol: 123  LDL: 54  HDL: 43  Triglycerides: 130    Nutrition Plan  Interventions  Diet Consult: Completed    Other Nutrition Intervention: Diet Class;Therapist/Pt Discussion;Provide with Written Material    Initial Rate Your Plate Score: 48    Follow-Up Rate Your Plate Score: 56      Education Completed  Nutrition Education Completed: Low Saturated fat diet;Low sodium diet;Weight management      Goals  Nutrition Goals (Next 30 days): Patient will lose weight      Goals Met  Nutrition Goals Met: Patient follows a low sodium diet;Patient states following a low saturated fat diet;Patient knows appropriate portion size;Patient has lost weight      Height, Weight, and  BMI  Weight: 234 lb 4.8 oz (106.3 kg)  Height: 5' 7\" (1.702 m)  BMI: 36.69      Nutrition Follow-up  Follow-up/Discharge: RD diet educ completed 12/4/2020. Pt reports eating ham, cheese and red meats more lately. Pt reports following a low fat/sodium as best as he can. Pt does read lables. Pt likes to eat chicken and turkey.          Other Risk Factors  Other Risk Factor Assessment: Reassessment      HTN Risk Factor: Hypertension      Pre Exercise BP: 110/70  Post Exercise BP: 110/68      Hypertension Plan  Goals  HTN Goals: Exercises regularly      Goals Met  HTN Goals Met: Follow low sodium diet;Take medication as prescribed      HTN Interventions  HTN Interventions: Therapist/patient discussion;Provide written material;Diet consult      HTN Education Completed  HTN Education Completed: Risk factor overview;Medication review;Low sodium diet      Tobacco Risk Factor: NA      Risk Factor Follow-up   Follow-up/Discharge: CRF: Family Hx. HTN, Dyslipidemia, Inactivity-pt is aware of all CRF.      " PSYCHOSOCIAL  Psychosocial Assessment: Reassessment       [facility] Q of L Summary Score: 15      PHQ-9 Total Score: 2      Psychosocial Risk Factor: NA      Psychosocial Plan  Interventions  Interventions: Provide written material;Individual education and counseling      Education Completed  Education Completed: Relaxation/Coping Techniques      Goals  Goals (Next 30 days): Patient demonstrates understanding of stress, no goals identified for the next 30 days      Goals Met  Goals Met: Practicing stress management skills;Identify stressors;Identified Support system    Psychosocial Follow-up  Follow-up/Discharge: Pt enjoys watching football and reading to relax.              Patient involved in Goal setting?: Yes

## 2021-06-15 NOTE — PROGRESS NOTES
"ITP ASSESSMENT   Assessment Day: 150 Day    Session Number: 29  Precautions: Standard Cardiac precautions-recent PCI with stent to RCA, Age, Deconditioned, Overweight and LBP discomfort off and on    Diagnosis: MI;Stent (PCI x 5 stent total )    Risk Stratification: High    Referring Provider: Richard Briceno MD   ITP: Dr. Carroll  EXERCISE  Exercise Assessment: Reassessment                            Exercise Plan  Goals Next 30 days  ADL'S: Goal #1: Pt to continue to increase MET level to 4.5-5 METs in cardiac rehab utilizing interval training on the TM for 25-30 minutes without any cv sx/sx to help support the goal of returning to carrying laundry up 14 stairs without any cv sx/sx.     Leisure: Goal #2: Pt wants to continue with weight loss. Pt current weight is 231.8lb and hopes to lose 2lbs down to 229 by the time he d/c's on 2/26/2021. Pt is continuing to use interval taining.     Work: LTG is to reach a 5-6MET level to return to all activities such as: carrying laundry upstairs (5METs), Self Propel Mow (4.5METs), using a \"scoop shovel on inclined drive way and carry up to 10lbs up incline (5-6 METs), Carry 40lbs of mulch (5 METs), climb step ladder (6+METs) w/out any cv sx/sx.       Education Goals: All goals in this section met    Education Goals Met: Medication review.;Has system for taking medication.;Patient can state cardiac s/s and appropriate emergency response.                          Goals Met                                    120 day ADL'S goals met: Goal #1 Partially MET: Pt has been exercising 3-4x/week for 20-30 minutes (limited on bike due to comfort on the seat) but when walking is doing 30 minutes for HEP and also is strength training at home 2x/week. Pt has been utilizing interval training and has increased to a 4.2 MET level doing so on the TM but will continue same goal of increasing MET level.     120 day Leisure goals met: Goal #2 Partially MET: Pt has lost ~3lbs in the past 30 days " "and has been utilizing interval training and strength training regularly to reach this goal of 0.5-1lb per week. Pt met with dietician also to help facilitate this goal. Pt wants to continue with weight loss goal.     120 day Work goals met: LTG partially MET: Pt has reached a 4+ MET level to return to yardwork come this spring . MET level mocks this activity. Pt has not reached a reach a 5-6MET level to return to all activities such as: carrying laundry upstairs (5METs), Self Propel Mow (4.5METs), using a \"scoop shovel on inclined drive way and carry up to 10lbs up incline (5-6 METs), Carry 40lbs of mulch (5 METs), climb step ladder (6+METs).`    120 Day Progress: Pt has reached a 4.2 MET level on the TM utilizing interval training for 20-30 minutes. Pt also has reached a 2.5 MET level for 10-15 minutes. Pt does continue to have slight chest discomfort at times but he reports it is getting better. Pt is progressing well and last day scheduled for 2/26/2021.BP has been more WNL with only rare hypertensive episodes the past 30 days.       90 day ADL'S goals met: Pt has started intervals and progressed to 3.8-4METs with intervals on TM    90 day Leisure goals met: Pt has done weights a few times in CR but not consistent. Pt agree's to plan. Pt did not lose weight over the last 30 days. Pt increased from 213.8lbs to 232.6lb.    90 day Work goals met: LTG not met-continue to progress    90 Day Progress: Pt has been progressing well. Occasional hypertensive. Continue to monitor BP closely      60 day ADL'S goals met: Goal #1 not met-pt has been exercising at home 2x/week and 2x/week in CR for 20-30 minutes. Pt is willing to increase to 3x/week and 30 minutes-goal revised      60 day Work goals met: LTG not met-continue with LTG adn safely progress pt to levels     60 Day Progression: Pt has been limited due to mid sternal chest discomfort-pt has been vague with cardiac s/s. Continue to educate on CV s/s and end points of " exercise/emergency plan. Pt has reached a 3.7-3.8METs on TM for 20-25 minutes. Change up modalties to progress to LTG of 5-6METs      30 day ADL'S goals met: Goal #1 met, pt states he is climbing 14 stairs at home without symptoms or SOB.  Pt did do stair climbing in CR 1x without difficulty.     30 day Leisure goals met: Goal#2 partially met, pt reports he was able to complete his yardwork, including raking and mowing without symptoms.  Pt has not consistently met MEt level goal of 4-4.5 in CR    30 day Work goals met: LTG partially met, pt was able to finish his yardwork, continues gradually increasing MEt level in cardiac rehab.    30 Day Progression: Pt has been attending CR 1x/week and not been exercising regularly on non-rehab days.  Encouraged pt to walk or ride his stationary bike at east 2-3x/week at home.  Pt will add Friday's at 8am for CR starting 11/13.  Pt has reached 3 MET level on treadmill and tolerated 4.7 MET level on stairs for 2 min.      Exercise Prescription  Exercise Mode: Treadmill;Bike;Nustep;Arm Erg.;Stairs;Hallway Walking (Continious and interval training. )    Frequency: 2x/week    Duration: 35-45 minutes    Intensity / THR: 20-30 beats above resting heart rate    RPE 11-14  Progression / Met level: 4.3-5    Resistive Training?: Yes (Pt doing regularly at home.)      Current Exercise (mins/week): 145      Interventions  Home Exercise:  Mode: Walking, Stationary Bike and Wts    Frequency: 3-4x/week    Duration: 20-30 minutes      Education Material : Individual education and counseling;Provide written material      Education Completed  Exercise Education Completed: Cardiac Anatomy;Signs and Symptoms;Medication review;RPE;Emergency Plan;Home Exercise;Warm up/cool down;BP/HR Reponse to exercise;FITT Principles;Stretching;Strength training;End point of exercise;Benefits of Exercise              Exercise Follow-up/Discharge  Follow up/Discharge: 2/5/2021: Skilled therapy needed to continue to  "monitor EKG for arryhythmias or ST changes s/p NSTEMI/stents. Pt does have rare episodes of chest pain which he reports is getting better. Pt plans to complete his 36 sessions and last day set up 2/26/2021. Pt has noticed an increase in endurance and stamina. Pt LTG is to 5 METs but feels he is getting back to activities. Pt is exercising more regulary at home and it is allowing us to progress pt. Pt enjoys coming to cardiac rehab. Will continue to try to change up modalities to continue to increase MET level. Pt also needs to be monitored for proper cv response to exercise as he does have hypertensive moments at times.    NUTRITION  Nutrition Assessment: Reassessment      Nutrition Risk Factors:  Nutrition Risk Factors: Dyslipidemia;Overweight  Cholesterol: 123  LDL: 54  HDL: 43  Triglycerides: 130      Nutrition Plan  Interventions  Diet Consult: Completed    Other Nutrition Intervention: Diet Class;Therapist/Pt Discussion;Provide with Written Material    Initial Rate Your Plate Score: 48    Pre Initial Rate Your Plate Score: 48   Follow-Up Rate Your Plate Score: 56      Education Completed  Nutrition Education Completed: Low Saturated fat diet;Low sodium diet;Weight management      Goals  Nutrition Goals (Next 30 days): Patient will lose weight      Goals Met  Nutrition Goals Met: Patient follows a low sodium diet;Patient states following a low saturated fat diet;Patient knows appropriate portion size;Patient has lost weight      Height, Weight, and  BMI  Weight: 231 lb 11.2 oz (105.1 kg)  Height: 5' 7\" (1.702 m)  BMI: 36.28      Nutrition Follow-up  Follow-up/Discharge: RD diet educ completed 12/4/2020. Pt reports that he is trying to follow a low fat/low sodium diet. Pt states that his wife does most of the cooking and that she does cook red meats sometimes. Pt continues to read lables and eat chicken and turkey.  Pt is watching his portions and again trying to work on weight loss. Pt is eating fresh fruits and " vegetabls. Pt is compliant with his statin medications.          Other Risk Factors  Other Risk Factor Assessment: Reassessment      HTN Risk Factor: Hypertension      Pre Exercise BP: 110/70  Post Exercise BP: 110/68      Hypertension Plan  Goals  HTN Goals: Patient demonstrates understanding of HTN, no goals identified for the next 30 days      Goals Met  HTN Goals Met: Follow low sodium diet;Take medication as prescribed;Exercises regularly      HTN Interventions  HTN Interventions: Therapist/patient discussion;Provide written material;Diet consult      HTN Education Completed  HTN Education Completed: Risk factor overview;Medication review;Low sodium diet      Tobacco Risk Factor: NA      Risk Factor Follow-up   Follow-up/Discharge: CRF: Family Hx. HTN, Dyslipidemia, Inactivity-pt is aware of all CRF. BP has been WNL most of the time but did have a few periods of hypertension.      PSYCHOSOCIAL  Psychosocial Assessment: Reassessment       [facility] Q of L Summary Score: 15          PHQ-9 Total Score: 2        Psychosocial Risk Factor: NA      Psychosocial Plan    Interventions: Provide written material;Individual education and counseling      Education Completed  Education Completed: Relaxation/Coping Techniques      Goals  Goals (Next 30 days): Patient demonstrates understanding of stress, no goals identified for the next 30 days      Goals Met  Goals Met: Practicing stress management skills;Identify stressors;Identified Support system      Psychosocial Follow-up  Follow-up/Discharge: Pt denies stress at this time. Pt enjoys watching sports and reading to relax. Pt enjoys coming to cardiac rehab and has noticed an increase in his endurance and stamina and enjoys coming to cardiac rehab.              Patient involved in Goal setting?: Yes

## 2021-06-15 NOTE — PROGRESS NOTES
"ITP ASSESSMENT   Assessment Day: 180 Day    Session Number: 35/36 (Last Day)  Precautions: Standard Cardiac precautions-recent PCI with stent to RCA, Age, Deconditioned, Overweight and LBP discomfort off and on    Diagnosis: MI;Stent (PCI x 5 stent total )    Risk Stratification: High    Referring Provider: Richard Briceno MD   ITP : Dr. Carroll  EXERCISE  Exercise Assessment: Discharge       6 Minute Walk Test   Pre   Pre Exercise HR: 68                    Pre Exercise BP: 122/64      Peak  Peak HR: 90                   Peak BP: 162/64    Peak feet: 1210    Peak O2 SAT: 98    Peak RPE: 4    Peak MPH: 2.29      Symptoms:  Peak Symptoms: denies cv sx/sx       5 mins. Post  5 Min Post HR: 78    5 Min Post BP: 118/60                           Exercise Plan  Goals Next 30 days  ADL'S: Goal #1: Pt to continue to increase MET level to 4.5-5 METs in cardiac rehab utilizing interval training on the TM for 25-30 minutes without any cv sx/sx to help support the goal of returning to carrying laundry up 14 stairs without any cv sx/sx.     Leisure: Goal #2: Pt wants to continue with weight loss. Pt current weight is 231.8lb and hopes to lose 2lbs down to 229 by the time he d/c's on 2/26/2021. Pt is continuing to use interval taining.     Work: LTG is to reach a 5-6MET level to return to all activities such as: carrying laundry upstairs (5METs), Self Propel Mow (4.5METs), using a \"scoop shovel on inclined drive way and carry up to 10lbs up incline (5-6 METs), Carry 40lbs of mulch (5 METs), climb step ladder (6+METs) w/out any cv sx/sx.       Education Goals: All goals in this section met    Education Goals Met: Medication review.;Has system for taking medication.;Patient can state cardiac s/s and appropriate emergency response.                          Goals Met  150+ day ADL'S goals met: Goal #1 MET: Pt has increased MET level to 5.1 METS in cardiac rehab utilizing interval training for 20-30 minutes on the treadmill. Pt has " been able to carry laundry up 14 stairs without any cv sx/sx.    150+ day Leisure goals met: Goal #2 Not MET: Pt unfortunately has gained ~3lbs since the last update due to eating bigger portions. We did review portion control and exercise and pt will work on weight loss on his own.    150+ day Work goals met: LTG MET: Pt has reached a max MET level of 5.1 to resume activities such as, self propel mow come this spring, carry mulch this summer (40lbs) , and climb a step stool on rare occasion (6+ METS), utilize scoop shovel when weather allows next year. He has not done some of these activities due to weather but feels confident he can and feels he is back to all other activities.   150+ Day Progress: Pt to has reached a 5.1 MET level on the TM for 20+ minutes utilizing interval training and has reached a 2.8 MET level on the bikes for 10-20 minutes. Pt is doing weights at home and has utilized the arm ergometer in cardiac rehab. Pt is pleased with progress and feels he is back to activities. PT has had rare episodes of chest discomfort at rest at home and we have monitored and reviewed emergency plan regarding this. PT does note they are getting better. Pt increased well in cardiac rehab and reached goals.      150+ day ADL'S goals met: Goal #1 MET: Pt has increased MET level to 5.1 METS in cardiac rehab utilizing interval training for 20-30 minutes on the treadmill. Pt has been able to carry laundry up 14 stairs without any cv sx/sx.    150+ day Leisure goals met: Goal #2 Not MET: Pt unfortunately has gained ~3lbs since the last update due to eating bigger portions. We did review portion control and exercise and pt will work on weight loss on his own.    150+ day Work goals met: LTG MET: Pt has reached a max MET level of 5.1 to resume activities such as, self propel mow come this spring, carry mulch this summer (40lbs) , and climb a step stool on rare occasion (6+ METS), utilize scoop shovel when weather allows  "next year. He has not done some of these activities due to weather but feels confident he can and feels he is back to all other activities.   150+ Day Progress: Pt to has reached a 5.1 MET level on the TM for 20+ minutes utilizing interval training and has reached a 2.8 MET level on the bikes for 10-20 minutes. Pt is doing weights at home and has utilized the arm ergometer in cardiac rehab. Pt is pleased with progress and feels he is back to activities. PT has had rare episodes of chest discomfort at rest at home and we have monitored and reviewed emergency plan regarding this. PT does note they are getting better. Pt increased well in cardiac rehab and reached goals.      120 day ADL'S goals met: Goal #1 Partially MET: Pt has been exercising 3-4x/week for 20-30 minutes (limited on bike due to comfort on the seat) but when walking is doing 30 minutes for HEP and also is strength training at home 2x/week. Pt has been utilizing interval training and has increased to a 4.2 MET level doing so on the TM but will continue same goal of increasing MET level.     120 day Leisure goals met: Goal #2 Partially MET: Pt has lost ~3lbs in the past 30 days and has been utilizing interval training and strength training regularly to reach this goal of 0.5-1lb per week. Pt met with dietician also to help facilitate this goal. Pt wants to continue with weight loss goal.     120 day Work goals met: LTG partially MET: Pt has reached a 4+ MET level to return to yardwork come this spring . MET level mocks this activity. Pt has not reached a reach a 5-6MET level to return to all activities such as: carrying laundry upstairs (5METs), Self Propel Mow (4.5METs), using a \"scoop shovel on inclined drive way and carry up to 10lbs up incline (5-6 METs), Carry 40lbs of mulch (5 METs), climb step ladder (6+METs).`    120 Day Progress: Pt has reached a 4.2 MET level on the TM utilizing interval training for 20-30 minutes. Pt also has reached a 2.5 " MET level for 10-15 minutes. Pt does continue to have slight chest discomfort at times but he reports it is getting better. Pt is progressing well and last day scheduled for 2/26/2021.BP has been more WNL with only rare hypertensive episodes the past 30 days.       90 day ADL'S goals met: Pt has started intervals and progressed to 3.8-4METs with intervals on TM    90 day Leisure goals met: Pt has done weights a few times in CR but not consistent. Pt agree's to plan. Pt did not lose weight over the last 30 days. Pt increased from 213.8lbs to 232.6lb.    90 day Work goals met: LTG not met-continue to progress    90 Day Progress: Pt has been progressing well. Occasional hypertensive. Continue to monitor BP closely      60 day ADL'S goals met: Goal #1 not met-pt has been exercising at home 2x/week and 2x/week in CR for 20-30 minutes. Pt is willing to increase to 3x/week and 30 minutes-goal revised      60 day Work goals met: LTG not met-continue with LTG adn safely progress pt to levels     60 Day Progression: Pt has been limited due to mid sternal chest discomfort-pt has been vague with cardiac s/s. Continue to educate on CV s/s and end points of exercise/emergency plan. Pt has reached a 3.7-3.8METs on TM for 20-25 minutes. Change up modalties to progress to LTG of 5-6METs      30 day ADL'S goals met: Goal #1 met, pt states he is climbing 14 stairs at home without symptoms or SOB.  Pt did do stair climbing in CR 1x without difficulty.     30 day Leisure goals met: Goal#2 partially met, pt reports he was able to complete his yardwork, including raking and mowing without symptoms.  Pt has not consistently met MEt level goal of 4-4.5 in CR    30 day Work goals met: LTG partially met, pt was able to finish his yardwork, continues gradually increasing MEt level in cardiac rehab.    30 Day Progression: Pt has been attending CR 1x/week and not been exercising regularly on non-rehab days.  Encouraged pt to walk or ride his  stationary bike at [facility] 2-3x/week at home.  Pt will add Friday's at 8am for CR starting 11/13.  Pt has reached 3 MET level on treadmill and tolerated 4.7 MET level on stairs for 2 min.          Resistive Training?: Yes (Pt doing regularly at home.)      Current Exercise (mins/week): 160      Interventions  Home Exercise:  Mode: Walking, Stationary Bike, Wts    Frequency: 4-6x/week    Duration: 30 minutes      Education Material : Provide written material;Individual education and counseling      Education Completed  Exercise Education Completed: Cardiac Anatomy;Signs and Symptoms;Medication review;RPE;Emergency Plan;Home Exercise;Warm up/cool down;FITT Principles;BP/HR Reponse to exercise;Stretching;Strength training;Benefits of Exercise;End point of exercise              Exercise Follow-up/Discharge  Follow up/Discharge: Pt graduating at this time after completing his 36 sessions of cardiac rehab. Pt pleased with progress and did reach a 5.1 MET level utilizing interval training. Pt had improvement by 10 feet in 6'walk and also had improvement in survey scores. Pt has enjoyed cardiac rehab and feels he is back to all activities. Pt will be exercising by walking and using stationary bike at home. Pt has enjoyed cardiac rehab and feels better both physically and emotionally since  starting cardiac rehab. Pt will be checking blood pressure 1-2x/week at home.    NUTRITION  Nutrition Assessment: Discharge      Nutrition Risk Factors:  Nutrition Risk Factors: Dyslipidemia;Overweight      Nutrition Plan  Interventions  Diet Consult: Completed    Other Nutrition Intervention: Therapist/Pt Discussion;Provide with Written Material    Initial Rate Your Plate Score: 48    Follow-Up Rate Your Plate Score: 56      Education Completed  Nutrition Education Completed: Low Saturated fat diet;Risk factor overview;Low sodium diet;Weight management          Goals Met  Nutrition Goals Met: Patient can identify their risk factors for  "CAD;Patient follows a low sodium diet;Completed Nutritional Risk Screen;Provided Rate your Plate Survey;Reviewed Dietitian schedule;Patient states following a low saturated fat diet;Patient knows appropriate portion size;Rate Your Plate Survey Score Improved      Height, Weight, and  BMI  Weight: 234 lb 12.8 oz (106.5 kg)  Height: 5' 7\" (1.702 m)  BMI: 36.77    Pre BMI: 36.77     Nutrition Follow-up  Follow-up/Discharge: Pt met with dietician and unfortunately has gained about 3lbs this past month. Pt does go back and forth within a few pounds. Pt feels he has been eating bigger portions but still following a heart healthy diet in regards to low fat/low sodium. Pt continues to eat red meat on occasion. Pt is eating fresh fruits and vegetables and also chicken and fish. Pt is not adding salt. We reviewed portions again due to a little weight gain and pt verbalized understanding. Pt is compliant with statin medication. Pt is drinking water. Pt is down about 5lbs total from when he started. Pt again is reading labels.          Other Risk Factors  Other Risk Factor Assessment: Discharge      HTN Risk Factor: Hypertension      BP: 122/64  Hypertension Plan      Goals Met  HTN Goals Met: Take medication as prescribed;Follow low sodium diet;Exercises regularly      HTN Interventions  HTN Interventions: Therapist/patient discussion;Provide written material      HTN Education Completed  HTN Education Completed: Low sodium diet;Medication review;Risk factor overview      Tobacco Risk Factor: NA      Risk Factor Follow-up   Follow-up/Discharge: CRF: Family Hx. HTN, Dyslipidemia, Inactivity-pt is aware of all CRF. BP has been WNL most of the time but did have a few periods of hypertension. Pt will monitor bp 1-2x/week at home.      PSYCHOSOCIAL  Psychosocial Assessment: Discharge       [facility]: [facility]    [facility]: 15     PHQ-9 Total Score: 0    Pre PHQ-9 Total Score: 2 "     Psychosocial Risk Factor: NA      Psychosocial Plan  Interventions    Interventions: Provide written material;Individual education and counseling      Education Completed  Education Completed: Relaxation/Coping Techniques;S/S of depression;Effects of stress on body          Goals Met  Goals Met: Identified Support system;Oriented to stress management classes;Identify stressors;Improvement in [employer] COOP score;Practicing stress management skills ([place] 15-13 and PHQ-9 2-0)      Psychosocial Follow-up  Follow-up/Discharge: Pt denies stress at this time. Pt enjoys watching sports and reading to relax. Pt enjoys coming to cardiac rehab and has noticed an increase in his endurance and stamina. Pt has enjoyed his time in cardiac rehab and is pleased with his progress.              Patient involved in Goal setting?: Yes

## 2021-06-15 NOTE — PROGRESS NOTES
[facility] Home Exercise Program/Discharge Summary  You have reached a 5.1 MET level and have completed 36 sessions of Cardiac Rehab.   Exercise Goals:   4-6x/week for aerobic exercise 30-60 minutes and 2-3x/week for strength training.   Modality Duration Intensity/  Rate of Perceived Exertion  OMNI Scale (1-10)   Warm-up 5 minutes 2-3   Walk 20-25 minutes 4-7   Bike 10-15 minutes+ 4-7   Cool Down 5 minutes 2-3   Strength Training *every other day 10-15 minutes 3 sets of 10 reps  Increase weights as tolerated.   Stretching 5 minutes 2-3   Continuous Exercise Heart Rate Guidelines:   20-30bpm> RHR (Resting Heart Rate) or [place] (60 to 75: 116-125 bpm)  Interval Exercise Program:  MIIT(1-5minutes): 50-60%: 110-116 bpm and/or RPE-O of 4-7  HIIT (1-2 minutes): 80%: 128 bpm and/or RPE-O of 7-9  Alternate between MIIT and HIIT x 5 cycles  Alternate between straight aerobic exercise and interval training.   Special Recommendations:    Continue to follow low fat, low salt, heart healthy diet.    Continue to follow up with your doctors/providers as recommended (e.g cholesterol).    A well rounded exercise program will included aerobic/cardiovascular exercise (e.g like walking, biking, or swimming ), strength training (e.g. free weights, exercise bands, or weight machines) and stretching program.   Stop Exercise!!! If any of the following occur:    Angina/chest pain    Dizziness    Excessive perspiration/cold sweats    Abnormal shortness of breath    Changes in heart rate (slow, fast, irregular)    Sudden fatigue or numbness    Nausea  Also...    Avoid extreme temperatures - exercise indoors if necessary:   Temp+ Humidity >160, Temp-Wind Chill <20    Wait at least 1 hour after a meal before strenuous activity    Do not exercise if you have a fever or are ill    Wear comfortable, supportive athletic clothing and shoes.  You are now on your way to a heart healthy lifestyle on your own. You can do it!

## 2021-06-16 PROBLEM — H90.3 SENSORINEURAL HEARING LOSS OF BOTH EARS: Status: ACTIVE | Noted: 2019-07-10

## 2021-06-16 PROBLEM — I20.0 UNSTABLE ANGINA (H): Status: ACTIVE | Noted: 2020-09-16

## 2021-06-16 PROBLEM — R79.89 ELEVATED TROPONIN: Status: ACTIVE | Noted: 2020-09-16

## 2021-06-16 PROBLEM — Z95.5 HX OF HEART ARTERY STENT: Status: ACTIVE | Noted: 2020-09-23

## 2021-06-16 PROBLEM — I21.4 NSTEMI (NON-ST ELEVATED MYOCARDIAL INFARCTION) (H): Status: ACTIVE | Noted: 2020-09-17

## 2021-06-16 PROBLEM — Z71.89 ACP (ADVANCE CARE PLANNING): Status: ACTIVE | Noted: 2020-09-16

## 2021-06-16 PROBLEM — Z96.1 PSEUDOPHAKIA, LEFT EYE: Status: ACTIVE | Noted: 2018-08-09
